# Patient Record
Sex: FEMALE | Race: WHITE | NOT HISPANIC OR LATINO
[De-identification: names, ages, dates, MRNs, and addresses within clinical notes are randomized per-mention and may not be internally consistent; named-entity substitution may affect disease eponyms.]

---

## 2019-08-08 ENCOUNTER — APPOINTMENT (OUTPATIENT)
Dept: ULTRASOUND IMAGING | Facility: CLINIC | Age: 84
End: 2019-08-08
Payer: MEDICARE

## 2019-08-08 ENCOUNTER — OUTPATIENT (OUTPATIENT)
Dept: OUTPATIENT SERVICES | Facility: HOSPITAL | Age: 84
LOS: 1 days | End: 2019-08-08

## 2019-08-08 DIAGNOSIS — Z00.8 ENCOUNTER FOR OTHER GENERAL EXAMINATION: ICD-10-CM

## 2019-08-08 PROCEDURE — 93970 EXTREMITY STUDY: CPT | Mod: 26

## 2019-08-23 ENCOUNTER — APPOINTMENT (OUTPATIENT)
Dept: CARDIOLOGY | Facility: CLINIC | Age: 84
End: 2019-08-23

## 2019-09-03 ENCOUNTER — APPOINTMENT (OUTPATIENT)
Dept: CARDIOLOGY | Facility: CLINIC | Age: 84
End: 2019-09-03

## 2019-09-11 ENCOUNTER — NON-APPOINTMENT (OUTPATIENT)
Age: 84
End: 2019-09-11

## 2019-09-11 ENCOUNTER — APPOINTMENT (OUTPATIENT)
Dept: CARDIOLOGY | Facility: CLINIC | Age: 84
End: 2019-09-11
Payer: MEDICARE

## 2019-09-11 VITALS
WEIGHT: 124 LBS | OXYGEN SATURATION: 99 % | DIASTOLIC BLOOD PRESSURE: 65 MMHG | BODY MASS INDEX: 25 KG/M2 | HEIGHT: 59 IN | HEART RATE: 70 BPM | SYSTOLIC BLOOD PRESSURE: 148 MMHG

## 2019-09-11 DIAGNOSIS — Z63.4 DISAPPEARANCE AND DEATH OF FAMILY MEMBER: ICD-10-CM

## 2019-09-11 DIAGNOSIS — Z72.3 LACK OF PHYSICAL EXERCISE: ICD-10-CM

## 2019-09-11 DIAGNOSIS — Z78.9 OTHER SPECIFIED HEALTH STATUS: ICD-10-CM

## 2019-09-11 DIAGNOSIS — Z60.2 PROBLEMS RELATED TO LIVING ALONE: ICD-10-CM

## 2019-09-11 DIAGNOSIS — R23.8 EFFUSION, UNSPECIFIED ANKLE: ICD-10-CM

## 2019-09-11 DIAGNOSIS — M25.473 EFFUSION, UNSPECIFIED ANKLE: ICD-10-CM

## 2019-09-11 PROCEDURE — 99205 OFFICE O/P NEW HI 60 MIN: CPT | Mod: 25

## 2019-09-11 PROCEDURE — 93000 ELECTROCARDIOGRAM COMPLETE: CPT

## 2019-09-11 SDOH — SOCIAL STABILITY - SOCIAL INSECURITY: PROBLEMS RELATED TO LIVING ALONE: Z60.2

## 2019-09-11 SDOH — SOCIAL STABILITY - SOCIAL INSECURITY: DISSAPEARANCE AND DEATH OF FAMILY MEMBER: Z63.4

## 2019-09-11 NOTE — DISCUSSION/SUMMARY
[Risks] : risks [Patient] : the patient [Benefits] : benefits [Alternatives] : alternatives [___ Month(s)] : [unfilled] month(s) [With Me] : with me [FreeTextEntry1] : This is a 89 year old woman with history of stroke, LE edema and  redness of leg\par \par 1) LE edema: US venous Duplex 2D echo\par 2) Stroke sydrome: carotid US and 2D echo. ct statins. f./u with PMD. \par discuss with daughter in law that we will avoid very aggressive interventional therapy.  we may consider medications based on what the testing shows.

## 2019-09-11 NOTE — PHYSICAL EXAM
[General Appearance - Well Developed] : well developed [Well Groomed] : well groomed [Normal Appearance] : normal appearance [No Deformities] : no deformities [General Appearance - Well Nourished] : well nourished [General Appearance - In No Acute Distress] : no acute distress [Normal Conjunctiva] : the conjunctiva exhibited no abnormalities [Eyelids - No Xanthelasma] : the eyelids demonstrated no xanthelasmas [Normal Oral Mucosa] : normal oral mucosa [No Oral Pallor] : no oral pallor [No Oral Cyanosis] : no oral cyanosis [Normal Jugular Venous A Waves Present] : normal jugular venous A waves present [Normal Jugular Venous V Waves Present] : normal jugular venous V waves present [No Jugular Venous Corbett A Waves] : no jugular venous corbett A waves [Heart Sounds] : normal S1 and S2 [Heart Rate And Rhythm] : heart rate and rhythm were normal [Murmurs] : no murmurs present [Exaggerated Use Of Accessory Muscles For Inspiration] : no accessory muscle use [Respiration, Rhythm And Depth] : normal respiratory rhythm and effort [Auscultation Breath Sounds / Voice Sounds] : lungs were clear to auscultation bilaterally [Abdomen Soft] : soft [Abdomen Tenderness] : non-tender [Abdomen Mass (___ Cm)] : no abdominal mass palpated [Abnormal Walk] : normal gait [Gait - Sufficient For Exercise Testing] : the gait was sufficient for exercise testing [Cyanosis, Localized] : no localized cyanosis [Nail Clubbing] : no clubbing of the fingernails [Petechial Hemorrhages (___cm)] : no petechial hemorrhages [FreeTextEntry1] : 2+  pitting ankle edema.  [Skin Color & Pigmentation] : normal skin color and pigmentation [No Venous Stasis] : no venous stasis [] : no rash [Skin Lesions] : no skin lesions [No Skin Ulcers] : no skin ulcer [No Xanthoma] : no  xanthoma was observed [Oriented To Time, Place, And Person] : oriented to person, place, and time [Affect] : the affect was normal [Mood] : the mood was normal [No Anxiety] : not feeling anxious

## 2019-09-11 NOTE — HISTORY OF PRESENT ILLNESS
[FreeTextEntry1] : "she wants to get checked out"\par \par This is a 89 year old woman with no significant past medical history here with evaluate for coronary artery disease \par She takes supplements. She feels ok. She denies any symptoms. no headaches. no dizziness. no dyspnea.  no syncope. no palpitaitons. \par No chest pain.  \par I spoke to daughter - in law. she says she went to the primary doctor , she has sweling of the feet and ankle, redness of the feet.  and also she takes medications for cholesterol. \par she had a possible stroke 2 years ago.  \par she was in hospital for coup[le of days in [place].  [facility].

## 2019-09-11 NOTE — REASON FOR VISIT
[Initial Evaluation] : an initial evaluation of [FreeTextEntry2] : "she wants to get checked out" [FreeTextEntry1] : "she wants to get checked out"

## 2019-10-14 ENCOUNTER — APPOINTMENT (OUTPATIENT)
Dept: CARDIOLOGY | Facility: CLINIC | Age: 84
End: 2019-10-14
Payer: MEDICARE

## 2019-10-14 PROCEDURE — 93880 EXTRACRANIAL BILAT STUDY: CPT

## 2019-10-14 PROCEDURE — 93970 EXTREMITY STUDY: CPT

## 2019-10-14 PROCEDURE — 93306 TTE W/DOPPLER COMPLETE: CPT

## 2019-10-18 RX ORDER — FUROSEMIDE 20 MG/1
20 TABLET ORAL
Qty: 30 | Refills: 4 | Status: ACTIVE | COMMUNITY
Start: 2019-10-18 | End: 1900-01-01

## 2020-01-17 ENCOUNTER — NON-APPOINTMENT (OUTPATIENT)
Age: 85
End: 2020-01-17

## 2020-01-17 ENCOUNTER — APPOINTMENT (OUTPATIENT)
Dept: CARDIOLOGY | Facility: CLINIC | Age: 85
End: 2020-01-17
Payer: MEDICARE

## 2020-01-17 VITALS
HEART RATE: 59 BPM | HEIGHT: 59 IN | DIASTOLIC BLOOD PRESSURE: 69 MMHG | RESPIRATION RATE: 16 BRPM | SYSTOLIC BLOOD PRESSURE: 116 MMHG | OXYGEN SATURATION: 96 % | WEIGHT: 129 LBS | BODY MASS INDEX: 26 KG/M2

## 2020-01-17 PROCEDURE — 99215 OFFICE O/P EST HI 40 MIN: CPT

## 2020-01-17 PROCEDURE — 93000 ELECTROCARDIOGRAM COMPLETE: CPT

## 2020-01-17 RX ORDER — MULTIVITAMIN
TABLET ORAL DAILY
Refills: 0 | Status: ACTIVE | COMMUNITY

## 2020-01-17 RX ORDER — PSYLLIUM HUSK 0.4 G
CAPSULE ORAL
Refills: 0 | Status: ACTIVE | COMMUNITY

## 2020-01-17 NOTE — CARDIOLOGY SUMMARY
[___] : [unfilled] [FreeTextEntry2] : 10/2019: LVEf 60%. Grade II diastolic dysfunction. elevated left atrial pressuyres. modeartte pulm HTN>  [de-identified] : jana XDUplex : 10/2019: NO DVT. \par Carotid DUpklex: moderate athersclerosis. no stenosis.

## 2020-01-17 NOTE — HISTORY OF PRESENT ILLNESS
[FreeTextEntry1] : "she wants to get checked out"\par \par HPI for today: : echo shows. diastolilc dysfuntction and moderate pulm HTN\par patient was started on lasix prn. she has not needed it.  no dyspnea. no leg edema now. \par \par \par old note: This is a 89 year old woman with no significant past medical history here with evaluate for coronary artery disease \par She takes supplements. She feels ok. She denies any symptoms. no headaches. no dizziness. no dyspnea.  no syncope. no palpitaitons. \par No chest pain.  \par I spoke to daughter - in law. she says she went to the primary doctor , she has sweling of the feet and ankle, redness of the feet.  and also she takes medications for cholesterol. \par she had a possible stroke 2 years ago.  \par she was in hospital for coup[le of days in [place].  [facility].

## 2020-01-17 NOTE — PHYSICAL EXAM
[General Appearance - Well Developed] : well developed [Normal Appearance] : normal appearance [General Appearance - Well Nourished] : well nourished [Well Groomed] : well groomed [No Deformities] : no deformities [Normal Conjunctiva] : the conjunctiva exhibited no abnormalities [General Appearance - In No Acute Distress] : no acute distress [Eyelids - No Xanthelasma] : the eyelids demonstrated no xanthelasmas [Normal Oral Mucosa] : normal oral mucosa [No Oral Pallor] : no oral pallor [No Oral Cyanosis] : no oral cyanosis [Normal Jugular Venous A Waves Present] : normal jugular venous A waves present [No Jugular Venous Corbett A Waves] : no jugular venous corbett A waves [Normal Jugular Venous V Waves Present] : normal jugular venous V waves present [Exaggerated Use Of Accessory Muscles For Inspiration] : no accessory muscle use [Respiration, Rhythm And Depth] : normal respiratory rhythm and effort [Heart Rate And Rhythm] : heart rate and rhythm were normal [Auscultation Breath Sounds / Voice Sounds] : lungs were clear to auscultation bilaterally [Heart Sounds] : normal S1 and S2 [Murmurs] : no murmurs present [Abdomen Soft] : soft [Abdomen Tenderness] : non-tender [Abdomen Mass (___ Cm)] : no abdominal mass palpated [Gait - Sufficient For Exercise Testing] : the gait was sufficient for exercise testing [Abnormal Walk] : normal gait [Cyanosis, Localized] : no localized cyanosis [Nail Clubbing] : no clubbing of the fingernails [Petechial Hemorrhages (___cm)] : no petechial hemorrhages [FreeTextEntry1] : 2+  pitting ankle edema.  [] : no rash [Skin Color & Pigmentation] : normal skin color and pigmentation [No Venous Stasis] : no venous stasis [Skin Lesions] : no skin lesions [No Skin Ulcers] : no skin ulcer [Oriented To Time, Place, And Person] : oriented to person, place, and time [No Xanthoma] : no  xanthoma was observed [No Anxiety] : not feeling anxious [Mood] : the mood was normal [Affect] : the affect was normal

## 2020-01-17 NOTE — DISCUSSION/SUMMARY
[Patient] : the patient [Benefits] : benefits [Risks] : risks [Alternatives] : alternatives [___ Month(s)] : [unfilled] month(s) [With Me] : with me [FreeTextEntry1] : This is a 89 year old woman with history of stroke, LE edema and  redness of leg\par \par 1) LE edema: resolved. pulm HTN and  \par 2) Stroke syndrome:  off  statins ; carotid aterhsxcelrosis.  asppirin 81.  No event monitor done. ecg in office were sinus. \par  conservative management.\par  3) Pulm hypertension and diastolic dysfunction.

## 2020-01-17 NOTE — REASON FOR VISIT
[Initial Evaluation] : an initial evaluation of [FreeTextEntry1] : "she wants to get checked out" [FreeTextEntry2] : "she wants to get checked out"

## 2020-04-06 ENCOUNTER — APPOINTMENT (OUTPATIENT)
Dept: OBGYN | Facility: CLINIC | Age: 85
End: 2020-04-06

## 2020-08-11 ENCOUNTER — NON-APPOINTMENT (OUTPATIENT)
Age: 85
End: 2020-08-11

## 2020-08-11 ENCOUNTER — APPOINTMENT (OUTPATIENT)
Dept: CARDIOLOGY | Facility: CLINIC | Age: 85
End: 2020-08-11
Payer: MEDICARE

## 2020-08-11 VITALS
OXYGEN SATURATION: 96 % | SYSTOLIC BLOOD PRESSURE: 128 MMHG | HEART RATE: 68 BPM | DIASTOLIC BLOOD PRESSURE: 58 MMHG | RESPIRATION RATE: 16 BRPM

## 2020-08-11 VITALS
DIASTOLIC BLOOD PRESSURE: 60 MMHG | OXYGEN SATURATION: 99 % | TEMPERATURE: 98.1 F | SYSTOLIC BLOOD PRESSURE: 96 MMHG | HEART RATE: 76 BPM | HEIGHT: 59 IN

## 2020-08-11 VITALS — DIASTOLIC BLOOD PRESSURE: 54 MMHG | SYSTOLIC BLOOD PRESSURE: 112 MMHG

## 2020-08-11 VITALS — SYSTOLIC BLOOD PRESSURE: 104 MMHG | DIASTOLIC BLOOD PRESSURE: 66 MMHG

## 2020-08-11 VITALS — SYSTOLIC BLOOD PRESSURE: 114 MMHG | DIASTOLIC BLOOD PRESSURE: 54 MMHG

## 2020-08-11 DIAGNOSIS — I27.20 PULMONARY HYPERTENSION, UNSPECIFIED: ICD-10-CM

## 2020-08-11 PROCEDURE — 99215 OFFICE O/P EST HI 40 MIN: CPT

## 2020-08-11 PROCEDURE — 93000 ELECTROCARDIOGRAM COMPLETE: CPT

## 2020-08-11 NOTE — PHYSICAL EXAM
[General Appearance - Well Developed] : well developed [Normal Appearance] : normal appearance [Well Groomed] : well groomed [No Deformities] : no deformities [General Appearance - Well Nourished] : well nourished [General Appearance - In No Acute Distress] : no acute distress [Eyelids - No Xanthelasma] : the eyelids demonstrated no xanthelasmas [Normal Conjunctiva] : the conjunctiva exhibited no abnormalities [No Oral Pallor] : no oral pallor [Normal Oral Mucosa] : normal oral mucosa [No Oral Cyanosis] : no oral cyanosis [Normal Jugular Venous A Waves Present] : normal jugular venous A waves present [No Jugular Venous Corbett A Waves] : no jugular venous corbett A waves [Normal Jugular Venous V Waves Present] : normal jugular venous V waves present [Respiration, Rhythm And Depth] : normal respiratory rhythm and effort [Exaggerated Use Of Accessory Muscles For Inspiration] : no accessory muscle use [Auscultation Breath Sounds / Voice Sounds] : lungs were clear to auscultation bilaterally [Heart Rate And Rhythm] : heart rate and rhythm were normal [Heart Sounds] : normal S1 and S2 [Abdomen Tenderness] : non-tender [Abdomen Soft] : soft [Murmurs] : no murmurs present [Abdomen Mass (___ Cm)] : no abdominal mass palpated [Abnormal Walk] : normal gait [Gait - Sufficient For Exercise Testing] : the gait was sufficient for exercise testing [Cyanosis, Localized] : no localized cyanosis [Nail Clubbing] : no clubbing of the fingernails [Petechial Hemorrhages (___cm)] : no petechial hemorrhages [Skin Color & Pigmentation] : normal skin color and pigmentation [] : no rash [Skin Lesions] : no skin lesions [No Venous Stasis] : no venous stasis [No Skin Ulcers] : no skin ulcer [No Xanthoma] : no  xanthoma was observed [Oriented To Time, Place, And Person] : oriented to person, place, and time [Affect] : the affect was normal [Mood] : the mood was normal [No Anxiety] : not feeling anxious [FreeTextEntry1] : 2+  pitting ankle edema.

## 2020-08-11 NOTE — HISTORY OF PRESENT ILLNESS
[FreeTextEntry1] : "she wants to get checked out"\par \par \par HPI for today: :  compliant with meds.  Not taking any meds.  nmo headaches. no dizziness. in summer she is swollen. and she takes water pills. \par \par old note:  echo shows. diastolilc dysfuntction and moderate pulm HTN\par patient was started on lasix prn. she has not needed it.  no dyspnea. no leg edema now. \par \par \par old note: This is a 89 year old woman with no significant past medical history here with evaluate for coronary artery disease \par She takes supplements. She feels ok. She denies any symptoms. no headaches. no dizziness. no dyspnea.  no syncope. no palpitaitons. \par No chest pain.  \par I spoke to daughter - in law. she says she went to the primary doctor , she has sweling of the feet and ankle, redness of the feet.  and also she takes medications for cholesterol. \par she had a possible stroke 2 years ago.  \par she was in hospital for coup[le of days in [place].  [facility].

## 2020-08-11 NOTE — DISCUSSION/SUMMARY
[Risks] : risks [Patient] : the patient [Alternatives] : alternatives [Benefits] : benefits [With Me] : with me [___ Month(s)] : [unfilled] month(s) [FreeTextEntry1] : This is a 89 year old woman with history of stroke, LE edema and  redness of leg\par \par 1) LE edema: resolved. pulm HTN and  PRN lasix.  \par 2) Stroke syndrome:  off  statins ; carotid atherosclerosis.  aspirin 81.  No event monitor done. ecg in office were sinus. \par  conservative management.\par  3) Pulm hypertension and diastolic dysfunction. :" Lasix PRn. no LE edema. \par 4) Low BP: no symptoms. Check for orthostasis. not on any BP meds.

## 2020-08-11 NOTE — CARDIOLOGY SUMMARY
[___] : [unfilled] [___] : [unfilled] [LVEF ___%] : LVEF [unfilled]% [FreeTextEntry2] : 10/2019: LVEf 60%. Grade II diastolic dysfunction. elevated left atrial pressuyres. modeartte pulm HTN>  [de-identified] : jana XDUplex : 10/2019: NO DVT. \par Carotid DUpklex: moderate athersclerosis. no stenosis.

## 2021-06-16 ENCOUNTER — APPOINTMENT (OUTPATIENT)
Dept: CARDIOLOGY | Facility: CLINIC | Age: 86
End: 2021-06-16
Payer: MEDICARE

## 2021-06-16 VITALS
TEMPERATURE: 97.8 F | SYSTOLIC BLOOD PRESSURE: 138 MMHG | HEIGHT: 59 IN | HEART RATE: 74 BPM | BODY MASS INDEX: 25.8 KG/M2 | OXYGEN SATURATION: 96 % | WEIGHT: 128 LBS | DIASTOLIC BLOOD PRESSURE: 81 MMHG

## 2021-06-16 DIAGNOSIS — I63.9 CEREBRAL INFARCTION, UNSPECIFIED: ICD-10-CM

## 2021-06-16 DIAGNOSIS — I50.32 CHRONIC DIASTOLIC (CONGESTIVE) HEART FAILURE: ICD-10-CM

## 2021-06-16 DIAGNOSIS — R60.0 LOCALIZED EDEMA: ICD-10-CM

## 2021-06-16 PROCEDURE — 93000 ELECTROCARDIOGRAM COMPLETE: CPT

## 2021-06-16 PROCEDURE — 99215 OFFICE O/P EST HI 40 MIN: CPT

## 2021-06-16 NOTE — CARDIOLOGY SUMMARY
[___] : [unfilled] [LVEF ___%] : LVEF [unfilled]% [de-identified] :  6/16/2021 Sinus  Rhythm  -First degree A-V block \par  Abnormal.  [FreeTextEntry2] : 10/2019: LVEf 60%. Grade II diastolic dysfunction. elevated left atrial pressuyres. modeartte pulm HTN>  [de-identified] : jana XDUplex : 10/2019: NO DVT. \par Carotid DUpklex: moderate athersclerosis. no stenosis.

## 2021-06-16 NOTE — DISCUSSION/SUMMARY
[Patient] : the patient [Risks] : risks [Benefits] : benefits [Alternatives] : alternatives [With Me] : with me [___ Month(s)] : in [unfilled] month(s) [FreeTextEntry1] : This is a 89 year old woman with history of stroke, LE edema and  redness of leg\par \par 1) LE edema: resolved. pulm HTN and  PRN lasix.  \par 2) Stroke syndrome:  off  statins ; carotid atherosclerosis.  aspirin 81.  No event monitor done. ecg in office were sinus. \par  conservative management.\par  3) Pulm hypertension and diastolic dysfunction. :" Lasix PRn. no LE edema. \par 4) Low BP:  resolved.

## 2021-06-16 NOTE — PHYSICAL EXAM
[General Appearance - Well Developed] : well developed [Normal Appearance] : normal appearance [Well Groomed] : well groomed [General Appearance - Well Nourished] : well nourished [No Deformities] : no deformities [General Appearance - In No Acute Distress] : no acute distress [Normal Conjunctiva] : the conjunctiva exhibited no abnormalities [Eyelids - No Xanthelasma] : the eyelids demonstrated no xanthelasmas [Normal Oral Mucosa] : normal oral mucosa [No Oral Pallor] : no oral pallor [No Oral Cyanosis] : no oral cyanosis [Normal Jugular Venous A Waves Present] : normal jugular venous A waves present [Normal Jugular Venous V Waves Present] : normal jugular venous V waves present [No Jugular Venous Corbett A Waves] : no jugular venous corbett A waves [Respiration, Rhythm And Depth] : normal respiratory rhythm and effort [Exaggerated Use Of Accessory Muscles For Inspiration] : no accessory muscle use [Auscultation Breath Sounds / Voice Sounds] : lungs were clear to auscultation bilaterally [Heart Rate And Rhythm] : heart rate and rhythm were normal [Heart Sounds] : normal S1 and S2 [Murmurs] : no murmurs present [Abdomen Soft] : soft [Abdomen Tenderness] : non-tender [Abdomen Mass (___ Cm)] : no abdominal mass palpated [Abnormal Walk] : normal gait [Gait - Sufficient For Exercise Testing] : the gait was sufficient for exercise testing [Nail Clubbing] : no clubbing of the fingernails [Cyanosis, Localized] : no localized cyanosis [Petechial Hemorrhages (___cm)] : no petechial hemorrhages [Skin Color & Pigmentation] : normal skin color and pigmentation [] : no rash [No Venous Stasis] : no venous stasis [Skin Lesions] : no skin lesions [No Skin Ulcers] : no skin ulcer [No Xanthoma] : no  xanthoma was observed [Oriented To Time, Place, And Person] : oriented to person, place, and time [Affect] : the affect was normal [Mood] : the mood was normal [No Anxiety] : not feeling anxious [FreeTextEntry1] : 2+  pitting ankle edema.

## 2021-07-25 ENCOUNTER — TRANSCRIPTION ENCOUNTER (OUTPATIENT)
Age: 86
End: 2021-07-25

## 2022-03-01 ENCOUNTER — EMERGENCY (EMERGENCY)
Facility: HOSPITAL | Age: 87
LOS: 1 days | Discharge: HOME | End: 2022-03-01
Attending: EMERGENCY MEDICINE
Payer: MEDICARE

## 2022-03-01 VITALS
DIASTOLIC BLOOD PRESSURE: 75 MMHG | OXYGEN SATURATION: 98 % | HEART RATE: 74 BPM | RESPIRATION RATE: 17 BRPM | SYSTOLIC BLOOD PRESSURE: 181 MMHG | TEMPERATURE: 98 F

## 2022-03-01 VITALS
SYSTOLIC BLOOD PRESSURE: 166 MMHG | HEIGHT: 62 IN | TEMPERATURE: 98 F | HEART RATE: 78 BPM | OXYGEN SATURATION: 96 % | WEIGHT: 119.93 LBS | DIASTOLIC BLOOD PRESSURE: 66 MMHG | RESPIRATION RATE: 18 BRPM

## 2022-03-01 LAB
ALBUMIN SERPL ELPH-MCNC: 3.3 G/DL — SIGNIFICANT CHANGE UP (ref 3.3–5.2)
ALP SERPL-CCNC: 84 U/L — SIGNIFICANT CHANGE UP (ref 40–120)
ALT FLD-CCNC: 10 U/L — SIGNIFICANT CHANGE UP
ANION GAP SERPL CALC-SCNC: 10 MMOL/L — SIGNIFICANT CHANGE UP (ref 5–17)
APPEARANCE UR: CLEAR — SIGNIFICANT CHANGE UP
APTT BLD: 25.8 SEC — LOW (ref 27.5–35.5)
AST SERPL-CCNC: 13 U/L — SIGNIFICANT CHANGE UP
BACTERIA # UR AUTO: ABNORMAL
BASOPHILS # BLD AUTO: 0.02 K/UL — SIGNIFICANT CHANGE UP (ref 0–0.2)
BASOPHILS NFR BLD AUTO: 0.1 % — SIGNIFICANT CHANGE UP (ref 0–2)
BILIRUB SERPL-MCNC: 0.6 MG/DL — SIGNIFICANT CHANGE UP (ref 0.4–2)
BILIRUB UR-MCNC: NEGATIVE — SIGNIFICANT CHANGE UP
BUN SERPL-MCNC: 12.2 MG/DL — SIGNIFICANT CHANGE UP (ref 8–20)
CALCIUM SERPL-MCNC: 9 MG/DL — SIGNIFICANT CHANGE UP (ref 8.6–10.2)
CHLORIDE SERPL-SCNC: 100 MMOL/L — SIGNIFICANT CHANGE UP (ref 98–107)
CO2 SERPL-SCNC: 25 MMOL/L — SIGNIFICANT CHANGE UP (ref 22–29)
COLOR SPEC: YELLOW — SIGNIFICANT CHANGE UP
CREAT SERPL-MCNC: 0.87 MG/DL — SIGNIFICANT CHANGE UP (ref 0.5–1.3)
DIFF PNL FLD: NEGATIVE — SIGNIFICANT CHANGE UP
EGFR: 61 ML/MIN/1.73M2 — SIGNIFICANT CHANGE UP
EOSINOPHIL # BLD AUTO: 0.02 K/UL — SIGNIFICANT CHANGE UP (ref 0–0.5)
EOSINOPHIL NFR BLD AUTO: 0.1 % — SIGNIFICANT CHANGE UP (ref 0–6)
EPI CELLS # UR: SIGNIFICANT CHANGE UP
GLUCOSE SERPL-MCNC: 108 MG/DL — HIGH (ref 70–99)
GLUCOSE UR QL: NEGATIVE MG/DL — SIGNIFICANT CHANGE UP
HCT VFR BLD CALC: 36.5 % — SIGNIFICANT CHANGE UP (ref 34.5–45)
HGB BLD-MCNC: 12 G/DL — SIGNIFICANT CHANGE UP (ref 11.5–15.5)
IMM GRANULOCYTES NFR BLD AUTO: 0.4 % — SIGNIFICANT CHANGE UP (ref 0–1.5)
INR BLD: 1.13 RATIO — SIGNIFICANT CHANGE UP (ref 0.88–1.16)
KETONES UR-MCNC: ABNORMAL
LEUKOCYTE ESTERASE UR-ACNC: NEGATIVE — SIGNIFICANT CHANGE UP
LYMPHOCYTES # BLD AUTO: 1.65 K/UL — SIGNIFICANT CHANGE UP (ref 1–3.3)
LYMPHOCYTES # BLD AUTO: 11.8 % — LOW (ref 13–44)
MCHC RBC-ENTMCNC: 31.4 PG — SIGNIFICANT CHANGE UP (ref 27–34)
MCHC RBC-ENTMCNC: 32.9 GM/DL — SIGNIFICANT CHANGE UP (ref 32–36)
MCV RBC AUTO: 95.5 FL — SIGNIFICANT CHANGE UP (ref 80–100)
MONOCYTES # BLD AUTO: 0.95 K/UL — HIGH (ref 0–0.9)
MONOCYTES NFR BLD AUTO: 6.8 % — SIGNIFICANT CHANGE UP (ref 2–14)
NEUTROPHILS # BLD AUTO: 11.29 K/UL — HIGH (ref 1.8–7.4)
NEUTROPHILS NFR BLD AUTO: 80.8 % — HIGH (ref 43–77)
NITRITE UR-MCNC: NEGATIVE — SIGNIFICANT CHANGE UP
PH UR: 8 — SIGNIFICANT CHANGE UP (ref 5–8)
PLATELET # BLD AUTO: 208 K/UL — SIGNIFICANT CHANGE UP (ref 150–400)
POTASSIUM SERPL-MCNC: 4.3 MMOL/L — SIGNIFICANT CHANGE UP (ref 3.5–5.3)
POTASSIUM SERPL-SCNC: 4.3 MMOL/L — SIGNIFICANT CHANGE UP (ref 3.5–5.3)
PROT SERPL-MCNC: 6.5 G/DL — LOW (ref 6.6–8.7)
PROT UR-MCNC: NEGATIVE — SIGNIFICANT CHANGE UP
PROTHROM AB SERPL-ACNC: 13.1 SEC — SIGNIFICANT CHANGE UP (ref 10.5–13.4)
RAPID RVP RESULT: SIGNIFICANT CHANGE UP
RBC # BLD: 3.82 M/UL — SIGNIFICANT CHANGE UP (ref 3.8–5.2)
RBC # FLD: 12.8 % — SIGNIFICANT CHANGE UP (ref 10.3–14.5)
RBC CASTS # UR COMP ASSIST: ABNORMAL /HPF (ref 0–4)
SARS-COV-2 RNA SPEC QL NAA+PROBE: SIGNIFICANT CHANGE UP
SODIUM SERPL-SCNC: 135 MMOL/L — SIGNIFICANT CHANGE UP (ref 135–145)
SP GR SPEC: 1.01 — SIGNIFICANT CHANGE UP (ref 1.01–1.02)
UROBILINOGEN FLD QL: NEGATIVE MG/DL — SIGNIFICANT CHANGE UP
WBC # BLD: 13.99 K/UL — HIGH (ref 3.8–10.5)
WBC # FLD AUTO: 13.99 K/UL — HIGH (ref 3.8–10.5)
WBC UR QL: SIGNIFICANT CHANGE UP /HPF (ref 0–5)

## 2022-03-01 PROCEDURE — 80053 COMPREHEN METABOLIC PANEL: CPT

## 2022-03-01 PROCEDURE — 72125 CT NECK SPINE W/O DYE: CPT | Mod: 26,MA

## 2022-03-01 PROCEDURE — 93010 ELECTROCARDIOGRAM REPORT: CPT

## 2022-03-01 PROCEDURE — 87086 URINE CULTURE/COLONY COUNT: CPT

## 2022-03-01 PROCEDURE — 72125 CT NECK SPINE W/O DYE: CPT | Mod: MA

## 2022-03-01 PROCEDURE — 93005 ELECTROCARDIOGRAM TRACING: CPT

## 2022-03-01 PROCEDURE — 71045 X-RAY EXAM CHEST 1 VIEW: CPT

## 2022-03-01 PROCEDURE — 70450 CT HEAD/BRAIN W/O DYE: CPT | Mod: MA

## 2022-03-01 PROCEDURE — 0225U NFCT DS DNA&RNA 21 SARSCOV2: CPT

## 2022-03-01 PROCEDURE — 81001 URINALYSIS AUTO W/SCOPE: CPT

## 2022-03-01 PROCEDURE — 85730 THROMBOPLASTIN TIME PARTIAL: CPT

## 2022-03-01 PROCEDURE — 85025 COMPLETE CBC W/AUTO DIFF WBC: CPT

## 2022-03-01 PROCEDURE — 99285 EMERGENCY DEPT VISIT HI MDM: CPT | Mod: 25

## 2022-03-01 PROCEDURE — 85610 PROTHROMBIN TIME: CPT

## 2022-03-01 PROCEDURE — 71045 X-RAY EXAM CHEST 1 VIEW: CPT | Mod: 26

## 2022-03-01 PROCEDURE — 36415 COLL VENOUS BLD VENIPUNCTURE: CPT

## 2022-03-01 PROCEDURE — 99285 EMERGENCY DEPT VISIT HI MDM: CPT

## 2022-03-01 PROCEDURE — 70450 CT HEAD/BRAIN W/O DYE: CPT | Mod: 26,MA

## 2022-03-01 RX ORDER — AZITHROMYCIN 500 MG/1
1 TABLET, FILM COATED ORAL
Qty: 1 | Refills: 0
Start: 2022-03-01 | End: 2022-03-05

## 2022-03-01 NOTE — ED ADULT NURSE NOTE - OBJECTIVE STATEMENT
Pt aox2. Pt unaware of why she is in the hospital. Patients aide bedside. Pt aide said she heard the patient fall but did not witness the fall. Pt denies being on blood thinners. Respirations even and unlabored. pt educated on plan of care, pt able to successfully teach back plan of care to RN, RN will continue to reeducate pt during hospital stay. Pt aox2. Pt unaware of why she is in the hospital. Patients aide bedside. Pt aide said she heard the patient fall but did not witness the fall. Pt denies being on blood thinners. Respirations even and unlabored. Denies chest pain, SOB, N/V. pt educated on plan of care, pt able to successfully teach back plan of care to RN, RN will continue to reeducate pt during hospital stay.

## 2022-03-01 NOTE — ED PROVIDER NOTE - OBJECTIVE STATEMENT
Patient is a 89 yo female with PMHx stroke, dementia BIBEMS from home with home aide after unwitnessed fall. As per daughter in law and home aide patient has had several days of decreased PO intake and nonbloody diarrhea as well as generalized weakness; the aide heard the patient fall in the bathroom and found the patient on the floor. Patient is not on any blood thinners, takes aspirin at home, has a history of stroke in the past, no other PMHx or medications. Patient currently aaox3, but does not recall the event, confused but at baseline as per family. Patient unsure if she struck her head; currently comfortable, in no distress, with no complaints. Moving all extremities equally, VSS. Patient is vaccinated; as per family she has had a nonproductive cough for several days, no SOB. Patient normally gets around her home with a walker and lives alone with help from her aide. Denies fevers, chills, dizziness, lightheadedness, dysphagia, dysarthria, diplopia, photophobia, cough, congestion, SOB, CP, abdominal pain, neck pain, back pain, dysuria, hematuria, hematochezia, hematemesis, n/v, recent travel, sick contacts, leg swelling.

## 2022-03-01 NOTE — ED PROVIDER NOTE - CARDIAC, MLM
Normal rate, regular rhythm.  Heart sounds S1, S2.  No murmurs, rubs or gallops. Good capillary refill, 2+ peripheral pulses, no peripheral edema

## 2022-03-01 NOTE — ED PROVIDER NOTE - CLINICAL SUMMARY MEDICAL DECISION MAKING FREE TEXT BOX
Patient is a 89 yo female with PMHx stroke, dementia BIBEMS from home with home aide after unwitnessed fall. As per daughter in law and home aide patient has had several days of decreased PO intake and nonbloody diarrhea as well as generalized weakness; the aide heard the patient fall in the bathroom and found the patient on the floor. Patient is not on any blood thinners, takes aspirin at home, has a history of stroke in the past, no other PMHx or medications. Patient currently aaox3, but does not recall the event, confused but at baseline as per family. Currently comfortable, in no distress, with no complaints. Moving all extremities equally, VSS. CT head and neck non contrast to r/o acute bleed/fractures. CXR, UA, covid swab to r/o UTI vs. PNA vs. URI. cbc, cmp, coags to r/o electrolyte abnormalities.

## 2022-03-01 NOTE — ED PROVIDER NOTE - CONSTITUTIONAL, MLM
normal... Well appearing, awake, alert, oriented to person, place, time/situation, confused but at baseline, and in no apparent distress.

## 2022-03-01 NOTE — ED PROVIDER NOTE - NEUROLOGICAL, MLM
Alert and oriented, no focal deficits, no motor or sensory deficits. CN 2-12 intact, PERRLA, EOMI, No FND, Moving all extremities equally, sensation intact, no dysmetria, negative heel to shin, No signs of trauma, no stepoffs or paraspinal tenderness, no chest wall tenderness

## 2022-03-01 NOTE — ED PROVIDER NOTE - PROGRESS NOTE DETAILS
JK - CT head and c spine without acute findings. CBC, CMP, UA, ecg all WNL.  CXR showing possible L lower lobe infiltrate; patient a febrile, VSS, no SOB, satting 97% on RA, in no apparent distress. Z-pack sent to pharmacy given WBC 13 and cough. Patient able to support herself off the bed, walking on her own with minimal assistance, no truncal ataxia, she has a walker at home. Patient ready and agreeable to DC; spoke with son over the phone who is agreeable to DC and will come pick her up. Patient in no distress.

## 2022-03-01 NOTE — ED ADULT NURSE NOTE - NSFALLRSKASSESSDT_ED_ALL_ED
63 yo female with no PMH except being an ex-smoker on nicotine patch admitted for acute left sided weakness that started this morning while at work.  Pt states she just didn't feel well and then started experiencing left sided upper and lower extremity tingling and numbness.  Pt also states having some dizziness with nausea and then just had a bad feeling.  Pt told co-workers and EMS called and brought pt in to ER and CT  head negative.  Decision was made for TPA.  ICU called for evaluation and admitted to our service.  While awaiting bed, pt had mild seizure activity with facial twitching.  Pt given ativan 1mg and keppra load of 1500mg.  Repeat head CT negative for acute pathology/yes 01-Mar-2022 16:03

## 2022-03-01 NOTE — ED ADULT TRIAGE NOTE - CHIEF COMPLAINT QUOTE
pt A&Ox3 hx dementia as per EMS pt at baseline, as per EMS home aide heard pt fall, pt denies any pain, has no complaints and doesn't know why she is here, EMS denies blood thinners

## 2022-03-01 NOTE — ED PROVIDER NOTE - ATTENDING CONTRIBUTION TO CARE
I, Jose Brown, personally saw the patient with the resident, and completed the key components of the history and physical exam. I then discussed the management plan with the resident.    89 yo F hx of dementia brought in by aid for possible fall. patient has been feel weak the past few days with decreased Po intake and non-bloody diarrhea and occasinal cough. CT head and cervical spine negative for fracture or bleed. wbc 13. Possible right lower lobe infiltrate on cxr. rvp negative. ua negative. patient ambulated with assistance (normally walks with walker at home). Family comfortable taking patient home with aids. Z-pack sent to pharmacy for possible pneumonia.

## 2022-03-01 NOTE — ED PROVIDER NOTE - PATIENT PORTAL LINK FT
You can access the FollowMyHealth Patient Portal offered by [facility] by registering at the following website: [email or web address]. By joining [employer]’s FollowMyHealth portal, you will also be able to view your health information using other applications (apps) compatible with our system.

## 2022-03-03 LAB
CULTURE RESULTS: SIGNIFICANT CHANGE UP
SPECIMEN SOURCE: SIGNIFICANT CHANGE UP

## 2022-03-23 ENCOUNTER — APPOINTMENT (OUTPATIENT)
Dept: CARDIOLOGY | Facility: CLINIC | Age: 87
End: 2022-03-23

## 2023-10-04 ENCOUNTER — INPATIENT (INPATIENT)
Facility: HOSPITAL | Age: 88
LOS: 0 days | Discharge: HOSPICE | DRG: 66 | End: 2023-10-05
Attending: INTERNAL MEDICINE | Admitting: INTERNAL MEDICINE
Payer: MEDICARE

## 2023-10-04 VITALS
RESPIRATION RATE: 14 BRPM | OXYGEN SATURATION: 95 % | HEART RATE: 85 BPM | SYSTOLIC BLOOD PRESSURE: 183 MMHG | DIASTOLIC BLOOD PRESSURE: 85 MMHG

## 2023-10-04 DIAGNOSIS — I63.9 CEREBRAL INFARCTION, UNSPECIFIED: ICD-10-CM

## 2023-10-04 LAB
ALBUMIN SERPL ELPH-MCNC: 3.6 G/DL — SIGNIFICANT CHANGE UP (ref 3.3–5.2)
ALP SERPL-CCNC: 111 U/L — SIGNIFICANT CHANGE UP (ref 40–120)
ALT FLD-CCNC: 12 U/L — SIGNIFICANT CHANGE UP
ANION GAP SERPL CALC-SCNC: 13 MMOL/L — SIGNIFICANT CHANGE UP (ref 5–17)
APTT BLD: 26.3 SEC — SIGNIFICANT CHANGE UP (ref 24.5–35.6)
AST SERPL-CCNC: 18 U/L — SIGNIFICANT CHANGE UP
BASOPHILS # BLD AUTO: 0.02 K/UL — SIGNIFICANT CHANGE UP (ref 0–0.2)
BASOPHILS NFR BLD AUTO: 0.2 % — SIGNIFICANT CHANGE UP (ref 0–2)
BILIRUB SERPL-MCNC: 0.3 MG/DL — LOW (ref 0.4–2)
BUN SERPL-MCNC: 17.7 MG/DL — SIGNIFICANT CHANGE UP (ref 8–20)
CALCIUM SERPL-MCNC: 9.1 MG/DL — SIGNIFICANT CHANGE UP (ref 8.4–10.5)
CHLORIDE SERPL-SCNC: 104 MMOL/L — SIGNIFICANT CHANGE UP (ref 96–108)
CK SERPL-CCNC: 17 U/L — LOW (ref 25–170)
CO2 SERPL-SCNC: 22 MMOL/L — SIGNIFICANT CHANGE UP (ref 22–29)
CREAT SERPL-MCNC: 1.04 MG/DL — SIGNIFICANT CHANGE UP (ref 0.5–1.3)
EGFR: 49 ML/MIN/1.73M2 — LOW
EOSINOPHIL # BLD AUTO: 0.05 K/UL — SIGNIFICANT CHANGE UP (ref 0–0.5)
EOSINOPHIL NFR BLD AUTO: 0.6 % — SIGNIFICANT CHANGE UP (ref 0–6)
GLUCOSE SERPL-MCNC: 147 MG/DL — HIGH (ref 70–99)
HCT VFR BLD CALC: 38.1 % — SIGNIFICANT CHANGE UP (ref 34.5–45)
HGB BLD-MCNC: 12.8 G/DL — SIGNIFICANT CHANGE UP (ref 11.5–15.5)
IMM GRANULOCYTES NFR BLD AUTO: 0.5 % — SIGNIFICANT CHANGE UP (ref 0–0.9)
INR BLD: 0.91 RATIO — SIGNIFICANT CHANGE UP (ref 0.85–1.18)
LYMPHOCYTES # BLD AUTO: 1.96 K/UL — SIGNIFICANT CHANGE UP (ref 1–3.3)
LYMPHOCYTES # BLD AUTO: 24 % — SIGNIFICANT CHANGE UP (ref 13–44)
MCHC RBC-ENTMCNC: 32.7 PG — SIGNIFICANT CHANGE UP (ref 27–34)
MCHC RBC-ENTMCNC: 33.6 GM/DL — SIGNIFICANT CHANGE UP (ref 32–36)
MCV RBC AUTO: 97.2 FL — SIGNIFICANT CHANGE UP (ref 80–100)
MONOCYTES # BLD AUTO: 0.39 K/UL — SIGNIFICANT CHANGE UP (ref 0–0.9)
MONOCYTES NFR BLD AUTO: 4.8 % — SIGNIFICANT CHANGE UP (ref 2–14)
NEUTROPHILS # BLD AUTO: 5.7 K/UL — SIGNIFICANT CHANGE UP (ref 1.8–7.4)
NEUTROPHILS NFR BLD AUTO: 69.9 % — SIGNIFICANT CHANGE UP (ref 43–77)
PLATELET # BLD AUTO: 183 K/UL — SIGNIFICANT CHANGE UP (ref 150–400)
POTASSIUM SERPL-MCNC: 4.4 MMOL/L — SIGNIFICANT CHANGE UP (ref 3.5–5.3)
POTASSIUM SERPL-SCNC: 4.4 MMOL/L — SIGNIFICANT CHANGE UP (ref 3.5–5.3)
PROT SERPL-MCNC: 6.8 G/DL — SIGNIFICANT CHANGE UP (ref 6.6–8.7)
PROTHROM AB SERPL-ACNC: 10.1 SEC — SIGNIFICANT CHANGE UP (ref 9.5–13)
RBC # BLD: 3.92 M/UL — SIGNIFICANT CHANGE UP (ref 3.8–5.2)
RBC # FLD: 12.4 % — SIGNIFICANT CHANGE UP (ref 10.3–14.5)
SODIUM SERPL-SCNC: 139 MMOL/L — SIGNIFICANT CHANGE UP (ref 135–145)
TROPONIN T SERPL-MCNC: <0.01 NG/ML — SIGNIFICANT CHANGE UP (ref 0–0.06)
WBC # BLD: 8.16 K/UL — SIGNIFICANT CHANGE UP (ref 3.8–10.5)
WBC # FLD AUTO: 8.16 K/UL — SIGNIFICANT CHANGE UP (ref 3.8–10.5)

## 2023-10-04 PROCEDURE — 93010 ELECTROCARDIOGRAM REPORT: CPT

## 2023-10-04 PROCEDURE — 0042T: CPT | Mod: MA

## 2023-10-04 PROCEDURE — 99222 1ST HOSP IP/OBS MODERATE 55: CPT

## 2023-10-04 PROCEDURE — 70498 CT ANGIOGRAPHY NECK: CPT | Mod: 26,MA

## 2023-10-04 PROCEDURE — 70496 CT ANGIOGRAPHY HEAD: CPT | Mod: 26,MA

## 2023-10-04 PROCEDURE — 99223 1ST HOSP IP/OBS HIGH 75: CPT

## 2023-10-04 PROCEDURE — 70450 CT HEAD/BRAIN W/O DYE: CPT | Mod: 26,MA,XU

## 2023-10-04 PROCEDURE — 99291 CRITICAL CARE FIRST HOUR: CPT

## 2023-10-04 RX ORDER — SODIUM CHLORIDE 9 MG/ML
1000 INJECTION INTRAMUSCULAR; INTRAVENOUS; SUBCUTANEOUS
Refills: 0 | Status: DISCONTINUED | OUTPATIENT
Start: 2023-10-04 | End: 2023-10-05

## 2023-10-04 RX ORDER — MORPHINE SULFATE 50 MG/1
2 CAPSULE, EXTENDED RELEASE ORAL
Refills: 0 | Status: DISCONTINUED | OUTPATIENT
Start: 2023-10-04 | End: 2023-10-05

## 2023-10-04 RX ORDER — ROBINUL 0.2 MG/ML
0.4 INJECTION INTRAMUSCULAR; INTRAVENOUS
Refills: 0 | Status: DISCONTINUED | OUTPATIENT
Start: 2023-10-04 | End: 2023-10-05

## 2023-10-04 RX ORDER — METOCLOPRAMIDE HCL 10 MG
5 TABLET ORAL EVERY 8 HOURS
Refills: 0 | Status: DISCONTINUED | OUTPATIENT
Start: 2023-10-04 | End: 2023-10-05

## 2023-10-04 NOTE — H&P ADULT - ASSESSMENT
89 yo F w/ hx prior CVA presents as found down on floor, unresponsive by aide. Last seen by son day prior reportedly at her baseline which is aaox3 but requiring some assistance with adls.  in ER found to have CVA and son at bedside requesting comfort care/hospice. no beds available at hospice [facility] until tomorrow thus patient to be admitted for mgmt.     acute CVA  comfort care  dnr/dni     son declines vitals check (as bp cuff causes discomfort)     no RRTs/ blood tests/imaging     supportive care with morphine/ativan/glycopyrrolate.      pending [facility] bed    stroke workup deferred.     ppx: deferred

## 2023-10-04 NOTE — H&P ADULT - HISTORY OF PRESENT ILLNESS
89 yo F w/ hx prior CVA presents as found down on floor, unresponsive by aide. Last seen by son day prior reportedly at her baseline which is aaox3 but requiring some assistance with adls.  in ER found to have CVA and son at bedside requesting comfort care/hospice. no beds available at [facility] until tomorrow thus patient to be admitted for mgmt.

## 2023-10-04 NOTE — ED ADULT TRIAGE NOTE - CHIEF COMPLAINT QUOTE
pt arrives from home unresponsive, LKW last night before bed, right sided gaze. code stroke activated, dr carl @ bedside. pt directed to critical care

## 2023-10-04 NOTE — ED PROVIDER NOTE - OBJECTIVE STATEMENT
89F hx prior CVA reportedly not on AC presents after being found by home aide on ground with R hand shaking. Last seen by son yesterday where she was at baseline - reportedly a/o x3 and ambulates independently. Pt presently unresponsive and unable to offer hx. Hypertensive to 200s systolic en route.

## 2023-10-04 NOTE — ED PROVIDER NOTE - PHYSICAL EXAMINATION
General: Awake, unresponsive, forced R eye deviation  HEENT: Normocephalic, atraumatic. No scleral icterus or conjunctival injection. Moist mucous membranes. Oropharynx clear.   Neck:. Soft and supple.  Cardiac: RRR, Peripheral pulses 2+ and symmetric. No LE edema.  Resp: Lungs CTAB. No accessory muscle use  Abd: Soft, non-tender, non-distended. No guarding, rebound, or rigidity.  Skin: No rashes, abrasions, or lacerations.  Neuro: Unresponsive. R arm twitching. Decreased movement in LUE. See NIHSS

## 2023-10-04 NOTE — ED PROVIDER NOTE - CLINICAL SUMMARY MEDICAL DECISION MAKING FREE TEXT BOX
89F hx CVA presents for unresponsiveness and R arm twitching. Unknown downtime - up to 24hrs. Pt with R deviation. Stroke vs seizure. Pt protecting airway at this time. Code stroke called and pt brought immediately to CT. Pt was given 1 IV Ativan with some improvement of her gaze deviation

## 2023-10-04 NOTE — ED PROVIDER NOTE - PROGRESS NOTE DETAILS
Pt is not an embolectomy candidate per neurointerventional team. Extensive discussion with son regarding goals of care. Son would like to persue comfort measures rather than aggressive intervention at this time. Will place palliative consult and admit. MOLST form signed. Daquan Machuca MD

## 2023-10-04 NOTE — GOALS OF CARE CONVERSATION - ADVANCED CARE PLANNING - CONVERSATION DETAILS
hx of cva in past, walks with walker, aides for adl's, with LVO, unresponsive. neuro IR stating no intervention given baseline/age/status. discussion with son/hcp jaycee Simpson would like dnr/dni and to keep mother comfortable. agreeable to palliative/hospice consults

## 2023-10-04 NOTE — CONSULT NOTE ADULT - SUBJECTIVE AND OBJECTIVE BOX
Neurology consult    NAHEED DIOP 89y Female     Patient is a 89y old  Female who presents with a chief complaint of found unresponsive (04 Oct 2023 15:52)      HPI:  89 yo F w/ hx prior CVA presents as found down on floor, unresponsive by aide. Last seen by son day prior reportedly at her baseline which is aaox3 but requiring some assistance with adls.  in ER found to have CVA and son at bedside requesting comfort care/hospice. [facility] thus patient to be admitted for mgmt.  (04 Oct 2023 15:52)      PMH:      PSH:       FAMILY HISTORY:      SOCIAL HISTORY:  No history of tobacco or alcohol use     Allergies    No Known Allergies    Intolerances            Vital Signs Last 24 Hrs  T(C): --  T(F): --  HR: 76 (04 Oct 2023 15:25) (76 - 85)  BP: 172/71 (04 Oct 2023 15:25) (172/71 - 183/85)  BP(mean): --  RR: 20 (04 Oct 2023 15:25) (14 - 20)  SpO2: 100% (04 Oct 2023 15:25) (95% - 100%)    Parameters below as of 04 Oct 2023 15:25  Patient On (Oxygen Delivery Method): nasal cannula  O2 Flow (L/min): 6    MEDICATIONS    glycopyrrolate Injectable 0.4 milliGRAM(s) IV Push four times a day PRN  LORazepam   Injectable 0.5 milliGRAM(s) IV Push every 4 hours PRN  metoclopramide Injectable 5 milliGRAM(s) IV Push every 8 hours PRN  morphine  - Injectable 2 milliGRAM(s) IV Push every 3 hours PRN        LABS:  CBC Full  -  ( 04 Oct 2023 13:32 )  WBC Count : 8.16 K/uL  RBC Count : 3.92 M/uL  Hemoglobin : 12.8 g/dL  Hematocrit : 38.1 %  Platelet Count - Automated : 183 K/uL  Mean Cell Volume : 97.2 fl  Mean Cell Hemoglobin : 32.7 pg  Mean Cell Hemoglobin Concentration : 33.6 gm/dL  Auto Neutrophil # : 5.70 K/uL  Auto Lymphocyte # : 1.96 K/uL  Auto Monocyte # : 0.39 K/uL  Auto Eosinophil # : 0.05 K/uL  Auto Basophil # : 0.02 K/uL  Auto Neutrophil % : 69.9 %  Auto Lymphocyte % : 24.0 %  Auto Monocyte % : 4.8 %  Auto Eosinophil % : 0.6 %  Auto Basophil % : 0.2 %    Urinalysis Basic - ( 04 Oct 2023 13:32 )    Color: x / Appearance: x / SG: x / pH: x  Gluc: 147 mg/dL / Ketone: x  / Bili: x / Urobili: x   Blood: x / Protein: x / Nitrite: x   Leuk Esterase: x / RBC: x / WBC x   Sq Epi: x / Non Sq Epi: x / Bacteria: x      10-04    139  |  104  |  17.7  ----------------------------<  147<H>  4.4   |  22.0  |  1.04    Ca    9.1      04 Oct 2023 13:32    TPro  6.8  /  Alb  3.6  /  TBili  0.3<L>  /  DBili  x   /  AST  18  /  ALT  12  /  AlkPhos  111  10-04    LIVER FUNCTIONS - ( 04 Oct 2023 13:32 )  Alb: 3.6 g/dL / Pro: 6.8 g/dL / ALK PHOS: 111 U/L / ALT: 12 U/L / AST: 18 U/L / GGT: x           Hemoglobin A1C:       PT/INR - ( 04 Oct 2023 13:32 )   PT: 10.1 sec;   INR: 0.91 ratio         PTT - ( 04 Oct 2023 13:32 )  PTT:26.3 sec      On Neurological Examination:    Mental Status - Patient is  obtunded. Grimaces to pain but not opening eyes.  She is non verbal and not following commands.    Cranial Nerves - Pupils are 2 and sluggish. There is forced gaze deviation to the right that cannot be overcome by oculocephalics.  No BTT bilaterally. There is a left central facial weakness.  Motor Exam -   Right upper ---withdraws to noxious  Left upper ---withdraws to noxious  Right lower ---triple flexion.  Left lower  ---triple flexion.      Sensory    grimaces to pain bilaterally    Coord and Gait -  Not assessed.       [facility] SS:   Date: 10/04/2023  Time: 1350  1a) Level of consciousness (0-3): ----------------------------------3  1b) Questions (0-2): ---------------------------------2  1c) Commands (0-2): ---------------------------------2  2  ) Gaze (0-2): ---------------------------------2  3  ) Visual field (0-3): ---------------------------------2  4  ) Facial palsy (0-3): ---------------------------------2  Motor  5a) Left arm (0-4): ---------------------------------3  5b) Right arm (0-4): ---------------------------------3  6a) Left leg (0-4): ---------------------------------3  6b) Right leg (0-4): ---------------------------------3  7  ) Ataxia (0-2):   Sensory  8  ) Sensory (0-2): ---------------------------------1  Speech  9  ) Language (0-3): ---------------------------------3  10) Dysarthria (0-2): ---------------------------------2  Extinction  11) Extinction/inattention (0-2):     Total score: = 29    Patient was last seen well  last night  Prestroke Modified Subhash: 4      RADIOLOGY ( All neurological imaging studies were independently reviewed and interpreted by me)  [facility]   CTA  CTP   CT Angio Brain Stroke Protocol  w/ IV Cont (10.04.23 @ 13:53) >    IMPRESSION:    CT brain:  Increased density within the right ICA and M1 segment consistent with the   presence of intraluminal clot.    No hydrocephalus, acute intracranial hemorrhage, mass effect, or brain   edema.    Chronic left thalamic capsular infarcts. Moderate white matter   microvascular ischemic disease.    CT brain perfusion:  Cerebral blood flow less than 30% = 96 mL and involves the right frontal   and parietal lobes.    Tmax greater than 6 seconds = 0 mL and involves the right parietal,   frontal, and temporal lobes.    Mismatch volume: 98 mL  Mismatch ratio: 2.0    CTA brain:  Near total lack of flow related enhancement of most of the right M1   segment. Overall markedly decreased enhancement of the more distal right   MCA.    CTA neck:  Progressively decreased flow related enhancement of the right common   carotid artery beginning at its proximal segment due to stenosis at the   level of the right ICA origin.    Lack of flow related enhancement of the left ICA beginning at its origin   due to the presence of partially calcified plaque.    Dr. Torres discussed these findings with Dr. Ball on 10/4/2023 1:49 PM   with read back.    ALANA TORRES MD; Attending Radiologist        MRI:  TTE

## 2023-10-04 NOTE — CONSULT NOTE ADULT - ASSESSMENT
IMPRESSION:  R MCA stroke.  - R ICA AND MCA occlusion.      ASSESSMENT/ PLAN:   - Patient was out of time window for tenecteplase.  - Case was discussed with Dr. Jude Leiva of Neuro IR. Patient was not offered thrombectomy due to large core infarct and poor MRS of 4.  - Admit to inpatient hospitalist service.  - Patient is made comfort care per family wishes.  - will sign off.      CORE MEASURES:        Admission NIHSS:      TPA: [] YES [] NO      LDL/HDL:     Depression Screen:      Statin Therapy:     Dysphagia Screen: [] PASS [] FAIL     Smoking [] YES [] NO      Afib [] YES [] NO     Stroke Education [] YES [] NO      Obtain screening lower extremity venous ultrasound in patients who meet 1 or more of the following criteria as patient is high risk for DVT/PE on admission:   [] History of DVT/PE  []Hypercoagulable states (Factor V Leiden, Cancer, OCP, etc. )  []Prolonged immobility (hemiplegia/hemiparesis/post operative or any other extended immobilization)  [] Transferred from outside facility (Rehab or Long term care)  [] Age </= to 50

## 2023-10-04 NOTE — ED PROVIDER NOTE - ATTENDING CONTRIBUTION TO CARE
Lizzy: I performed a face to face evaluation of this patient and performed a full history and physical examination on the patient.  I agree with the resident's history, physical examination, and plan of the patient unless otherwise noted. My brief assessment is as follows: hx cva 5 years ago not on meds present s/p being found unresponsive. per ems, was at baseline yesterday (ambulating with walker, aide for some adls), aide found pt unresponsive this morning in feces/urine. arrives eyes deviated right, right arm tapping though sometimes intentional movement, left arm/leg weakness. not following any commands. nihss 25. rrr, satting 98% on nrb breathing ~20, initially without any gargling/snoring.  code stroke showing right ica occlusion and large core infarct with mismatch. Neuro IR consulted and reommending no intervention given pt age/baseline and extent. discussion with pt son/hcp Calvin who is bedside, does not want intubation, wants mother to be comfortable, agreeable to palliative/hospice.

## 2023-10-05 ENCOUNTER — TRANSCRIPTION ENCOUNTER (OUTPATIENT)
Age: 88
End: 2023-10-05

## 2023-10-05 VITALS
TEMPERATURE: 99 F | RESPIRATION RATE: 18 BRPM | HEART RATE: 71 BPM | SYSTOLIC BLOOD PRESSURE: 124 MMHG | DIASTOLIC BLOOD PRESSURE: 73 MMHG | OXYGEN SATURATION: 93 %

## 2023-10-05 PROCEDURE — 99238 HOSP IP/OBS DSCHRG MGMT 30/<: CPT

## 2023-10-05 RX ORDER — MORPHINE SULFATE 50 MG/1
2 CAPSULE, EXTENDED RELEASE ORAL
Qty: 0 | Refills: 0 | DISCHARGE
Start: 2023-10-05

## 2023-10-05 NOTE — DISCHARGE NOTE NURSING/CASE MANAGEMENT/SOCIAL WORK - PATIENT PORTAL LINK FT
You can access the FollowMyHealth Patient Portal offered by [facility] by registering at the following website: [email or web address]. By joining [facility] FollowMyHealth portal, you will also be able to view your health information using other applications (apps) compatible with our system.

## 2023-10-05 NOTE — DISCHARGE NOTE PROVIDER - NSDCCPCAREPLAN_GEN_ALL_CORE_FT
PRINCIPAL DISCHARGE DIAGNOSIS  Diagnosis: Ischemic stroke  Assessment and Plan of Treatment: comfort care

## 2023-10-05 NOTE — DISCHARGE NOTE PROVIDER - NSDCMRMEDTOKEN_GEN_ALL_CORE_FT
LORazepam 1 mg/mL-NaCl 0.9% intravenous solution: 0.5 milliliter(s) intravenous every 4 hours As needed RR&gt;22, dyspnea or pain  morphine: 2 milligram(s) intravenous every 4 hours as needed for  shortness of breath and/or wheezing

## 2023-10-05 NOTE — DISCHARGE NOTE PROVIDER - HOSPITAL COURSE
89 yo F w/ hx prior CVA presents as found down on floor, unresponsive by aide. Last seen by son day prior reportedly at her baseline which is aaox3 but requiring some assistance with adls.  in ER found to have CVA and son at bedside requesting comfort care/hospice. no beds available at hospice [facility] until tomorrow thus patient to be admitted for mgmt.     acute CVA  comfort care  dnr/dni     son declines vitals check (as bp cuff causes discomfort)     no RRTs/ blood tests/imaging     supportive care with morphine/ativan/glycopyrrolate.      [facility]    stroke workup deferred.     ppx: deferred

## 2023-11-13 PROCEDURE — 84484 ASSAY OF TROPONIN QUANT: CPT

## 2023-11-13 PROCEDURE — 85025 COMPLETE CBC W/AUTO DIFF WBC: CPT

## 2023-11-13 PROCEDURE — 80053 COMPREHEN METABOLIC PANEL: CPT

## 2023-11-13 PROCEDURE — 0042T: CPT | Mod: MA

## 2023-11-13 PROCEDURE — 99285 EMERGENCY DEPT VISIT HI MDM: CPT

## 2023-11-13 PROCEDURE — 70450 CT HEAD/BRAIN W/O DYE: CPT | Mod: MA

## 2023-11-13 PROCEDURE — 70498 CT ANGIOGRAPHY NECK: CPT | Mod: MA

## 2023-11-13 PROCEDURE — 93005 ELECTROCARDIOGRAM TRACING: CPT

## 2023-11-13 PROCEDURE — 85610 PROTHROMBIN TIME: CPT

## 2023-11-13 PROCEDURE — 85730 THROMBOPLASTIN TIME PARTIAL: CPT

## 2023-11-13 PROCEDURE — 82550 ASSAY OF CK (CPK): CPT

## 2023-11-13 PROCEDURE — 36415 COLL VENOUS BLD VENIPUNCTURE: CPT

## 2023-11-13 PROCEDURE — 70496 CT ANGIOGRAPHY HEAD: CPT | Mod: MA

## 2023-11-13 PROCEDURE — 82962 GLUCOSE BLOOD TEST: CPT
